# Patient Record
(demographics unavailable — no encounter records)

---

## 2025-03-15 NOTE — CARDIOLOGY SUMMARY
[de-identified] : 3/14/25 AF with V paced rhythm  [de-identified] : 4/2021: small defect, moderate severity of apical wall that is fixed consistent with gating artifact, Mod sized fixed defect consistent with subdiaphregmatic attenuation. LV perfusion normal. No ischemia noted.  [de-identified] : 9/2021: LVEF 59%, mild to mod MR, TR, and mod pHTN\par  \par  10/16/2015, normal LV function

## 2025-03-15 NOTE — PHYSICAL EXAM
[Well Developed] : well developed [Well Nourished] : well nourished [No Acute Distress] : no acute distress [Normal Conjunctiva] : normal conjunctiva [Normal Venous Pressure] : normal venous pressure [No Carotid Bruit] : no carotid bruit [Normal S1, S2] : normal S1, S2 [No Murmur] : no murmur [No Rub] : no rub [No Gallop] : no gallop [Clear Lung Fields] : clear lung fields [Good Air Entry] : good air entry [No Respiratory Distress] : no respiratory distress  [Soft] : abdomen soft [Non Tender] : non-tender [No Masses/organomegaly] : no masses/organomegaly [Normal Bowel Sounds] : normal bowel sounds [Normal Gait] : normal gait [No Edema] : no edema [No Cyanosis] : no cyanosis [No Clubbing] : no clubbing [No Varicosities] : no varicosities [No Rash] : no rash [No Skin Lesions] : no skin lesions [Moves all extremities] : moves all extremities [No Focal Deficits] : no focal deficits [Normal Speech] : normal speech [Alert and Oriented] : alert and oriented [Normal memory] : normal memory [de-identified] : +3 B/L LE edema  [de-identified] : chronic skin changes and trace edema B/L

## 2025-03-15 NOTE — PHYSICAL EXAM
[Well Developed] : well developed [Well Nourished] : well nourished [No Acute Distress] : no acute distress [Normal Conjunctiva] : normal conjunctiva [Normal Venous Pressure] : normal venous pressure [No Carotid Bruit] : no carotid bruit [Normal S1, S2] : normal S1, S2 [No Murmur] : no murmur [No Rub] : no rub [No Gallop] : no gallop [Clear Lung Fields] : clear lung fields [Good Air Entry] : good air entry [No Respiratory Distress] : no respiratory distress  [Soft] : abdomen soft [Non Tender] : non-tender [No Masses/organomegaly] : no masses/organomegaly [Normal Bowel Sounds] : normal bowel sounds [Normal Gait] : normal gait [No Edema] : no edema [No Cyanosis] : no cyanosis [No Clubbing] : no clubbing [No Varicosities] : no varicosities [No Rash] : no rash [No Skin Lesions] : no skin lesions [Moves all extremities] : moves all extremities [No Focal Deficits] : no focal deficits [Normal Speech] : normal speech [Alert and Oriented] : alert and oriented [Normal memory] : normal memory [de-identified] : +3 B/L LE edema  [de-identified] : chronic skin changes and trace edema B/L

## 2025-03-15 NOTE — CARDIOLOGY SUMMARY
[de-identified] : 3/14/25 AF with V paced rhythm  [de-identified] : 4/2021: small defect, moderate severity of apical wall that is fixed consistent with gating artifact, Mod sized fixed defect consistent with subdiaphregmatic attenuation. LV perfusion normal. No ischemia noted.  [de-identified] : 9/2021: LVEF 59%, mild to mod MR, TR, and mod pHTN\par  \par  10/16/2015, normal LV function

## 2025-03-15 NOTE — DISCUSSION/SUMMARY
[EKG obtained to assist in diagnosis and management of assessed problem(s)] : EKG obtained to assist in diagnosis and management of assessed problem(s) [FreeTextEntry1] : Impression:  1. Complete AV block: s/p AVJ ablation and PPM placement. Device checked today and reveals normal function with adequate pacing/sensing thresholds. He is in AFib 100% of the time. No events for review.  Resume routine follow up with device clinic as scheduled. Will request results of last echo and NST and may discuss CT angio of heart if he continues to have symptoms. Patient should follow-up with Dr. Luna for evaluation of chest discomfort.   2. Permanent afib: s/p AVJ ablation and WATCHMAN procedure. Off AC, on ASA 81mg only.   3. HTN: resume oral antihypertensives as prescribed. Encouraged heart healthy diet, sodium restriction, and weight loss. Continue regular f/u with Cardiologist for further HTN management.  4. HLD: resume statin therapy as prescribed and regular f/u with Cardiologist for routine lipid monitoring and management.    Continue f/u with Cardiologist and RTO for f/u in 6 months.

## 2025-03-15 NOTE — PHYSICAL EXAM
[Well Developed] : well developed [Well Nourished] : well nourished [No Acute Distress] : no acute distress [Normal Conjunctiva] : normal conjunctiva [Normal Venous Pressure] : normal venous pressure [No Carotid Bruit] : no carotid bruit [Normal S1, S2] : normal S1, S2 [No Murmur] : no murmur [No Rub] : no rub [No Gallop] : no gallop [Clear Lung Fields] : clear lung fields [Good Air Entry] : good air entry [No Respiratory Distress] : no respiratory distress  [Soft] : abdomen soft [Non Tender] : non-tender [No Masses/organomegaly] : no masses/organomegaly [Normal Bowel Sounds] : normal bowel sounds [Normal Gait] : normal gait [No Edema] : no edema [No Cyanosis] : no cyanosis [No Clubbing] : no clubbing [No Varicosities] : no varicosities [No Rash] : no rash [No Skin Lesions] : no skin lesions [Moves all extremities] : moves all extremities [No Focal Deficits] : no focal deficits [Normal Speech] : normal speech [Alert and Oriented] : alert and oriented [Normal memory] : normal memory [de-identified] : +3 B/L LE edema  [de-identified] : chronic skin changes and trace edema B/L

## 2025-03-15 NOTE — CARDIOLOGY SUMMARY
[de-identified] : 3/14/25 AF with V paced rhythm  [de-identified] : 4/2021: small defect, moderate severity of apical wall that is fixed consistent with gating artifact, Mod sized fixed defect consistent with subdiaphregmatic attenuation. LV perfusion normal. No ischemia noted.  [de-identified] : 9/2021: LVEF 59%, mild to mod MR, TR, and mod pHTN\par  \par  10/16/2015, normal LV function

## 2025-03-15 NOTE — HISTORY OF PRESENT ILLNESS
[FreeTextEntry1] : Corey Han is a 71y/o man with Hx of CAD s/p multiple PCI, HTN, DM on insulin, PVD, COPD, permanent atrial fibrillation s/p Watchman procedure and AVJ ablation s/p dual chamber PPM placement (initial implant 2/9/2014, gen change 2/9/23) who presents today for routine device check and f/u. Underwent bariatric surgery back in 11/2021. Underwent botox injection for spasmatic dysphonia and has hoarse voice now.  Recovering well since that time. Does struggle with more dyspnea on exertion, fatigue and chest tightness with exertion. Had a stress test and ECHO with [facility] last year, both returned normal. Device check today with normal function set nadege VVIR 70 and adjustment made to accelerometer.  Denies chest pain, palpitations, SOB at rest, syncope or near syncope.

## 2025-03-15 NOTE — HISTORY OF PRESENT ILLNESS
[FreeTextEntry1] : Corey Han is a 73y/o man with Hx of CAD s/p multiple PCI, HTN, DM on insulin, PVD, COPD, permanent atrial fibrillation s/p Watchman procedure and AVJ ablation s/p dual chamber PPM placement (initial implant 2/9/2014, gen change 2/9/23) who presents today for routine device check and f/u. Underwent bariatric surgery back in 11/2021. Underwent botox injection for spasmatic dysphonia and has hoarse voice now.  Recovering well since that time. Does struggle with more dyspnea on exertion, fatigue and chest tightness with exertion. Had a stress test and ECHO with [facility] group last year, both returned normal. Device check today with normal function set nadege VVIR 70 and adjustment made to accelerometer.  Denies chest pain, palpitations, SOB at rest, syncope or near syncope.

## 2025-03-15 NOTE — HISTORY OF PRESENT ILLNESS
[FreeTextEntry1] : Corey Han is a 73y/o man with Hx of CAD s/p multiple PCI, HTN, DM on insulin, PVD, COPD, permanent atrial fibrillation s/p Watchman procedure and AVJ ablation s/p dual chamber PPM placement (initial implant 2/9/2014, gen change 2/9/23) who presents today for routine device check and f/u. Underwent bariatric surgery back in 11/2021. Underwent botox injection for spasmatic dysphonia and has hoarse voice now.  Recovering well since that time. Does struggle with more dyspnea on exertion, fatigue and chest tightness with exertion. Had a stress test and ECHO with [facility] group last year, both returned normal. Device check today with normal function set nadege VVIR 70 and adjustment made to accelerometer.  Denies chest pain, palpitations, SOB at rest, syncope or near syncope.